# Patient Record
Sex: FEMALE | Race: WHITE | Employment: FULL TIME | URBAN - METROPOLITAN AREA
[De-identification: names, ages, dates, MRNs, and addresses within clinical notes are randomized per-mention and may not be internally consistent; named-entity substitution may affect disease eponyms.]

---

## 2017-02-08 PROBLEM — I10 CHRONIC HYPERTENSION: Status: ACTIVE | Noted: 2017-02-08

## 2017-03-01 PROBLEM — O24.419 GDM (GESTATIONAL DIABETES MELLITUS): Status: ACTIVE | Noted: 2017-03-01

## 2017-03-06 ENCOUNTER — HOSPITAL ENCOUNTER (OUTPATIENT)
Dept: DIABETES SERVICES | Age: 32
Discharge: HOME | End: 2017-03-06
Payer: COMMERCIAL

## 2017-03-06 VITALS — WEIGHT: 186 LBS | BODY MASS INDEX: 35.12 KG/M2 | HEIGHT: 61 IN

## 2017-03-06 PROCEDURE — G0109 DIAB MANAGE TRN IND/GROUP: HCPCS

## 2017-03-06 NOTE — PROGRESS NOTES
Gestational Diabetes Self-Management Support Plan    Services Provided: Pt received education on nutrition and meal planning during pregnancy. Emotional support for adjustment to diagnosis was provided. Care Plan/Recommendations:  Pt instructed to record blood sugar 4x/day and record all meals and snacks. Pt to bring information to appointments with [facility]. Notes for Follow Up:   1. Barriers to checking blood glucose and adherence to meal plan identified:   None   2. Barriers to psychological adjustment to diagnosis identified:   Acceptance  3.  Patient needs to be seen by [facility] ASAP due to:    Appointment scheduled 3/9/17    Josue Gary MS, RD, LD  [facility]   [phone]

## 2017-03-09 PROBLEM — O24.410 DIET CONTROLLED GESTATIONAL DIABETES MELLITUS (GDM) IN SECOND TRIMESTER: Status: ACTIVE | Noted: 2017-03-01

## 2017-03-09 PROBLEM — O16.2 HYPERTENSION AFFECTING PREGNANCY IN SECOND TRIMESTER: Status: ACTIVE | Noted: 2017-02-08

## 2017-07-09 ENCOUNTER — HOSPITAL ENCOUNTER (INPATIENT)
Age: 32
LOS: 2 days | Discharge: HOME | End: 2017-07-11
Attending: OBSTETRICS & GYNECOLOGY | Admitting: OBSTETRICS & GYNECOLOGY
Payer: COMMERCIAL

## 2017-07-09 DIAGNOSIS — Z37.9 NORMAL LABOR: Primary | ICD-10-CM

## 2017-07-09 LAB
ABO + RH BLD: NORMAL
BASE DEFICIT BLDCOA-SCNC: 3.9 MMOL/L (ref 0–2)
BASE DEFICIT BLDCOV-SCNC: 3.6 MMOL/L (ref 1.9–7.7)
BDY SITE: ABNORMAL
BDY SITE: ABNORMAL
BLOOD GROUP ANTIBODIES SERPL: NORMAL
ERYTHROCYTE [DISTWIDTH] IN BLOOD BY AUTOMATED COUNT: 13.7 % (ref 11.9–14.6)
GLUCOSE BLD STRIP.AUTO-MCNC: 101 MG/DL (ref 65–100)
HCO3 BLDCOA-SCNC: 22 MMOL/L (ref 22–26)
HCO3 BLDV-SCNC: 19 MMOL/L
HCT VFR BLD AUTO: 40.1 % (ref 35.8–46.3)
HGB BLD-MCNC: 14.2 G/DL (ref 11.7–15.4)
MCH RBC QN AUTO: 32.7 PG (ref 26.1–32.9)
MCHC RBC AUTO-ENTMCNC: 35.4 G/DL (ref 31.4–35)
MCV RBC AUTO: 92.4 FL (ref 79.6–97.8)
PCO2 BLDCOA: 42 MMHG (ref 33–49)
PCO2 BLDCOV: 27 MMHG (ref 14.1–43.3)
PH BLDCOA: 7.33 [PH] (ref 7.21–7.31)
PH BLDCOV: 7.46 [PH] (ref 7.2–7.44)
PLATELET # BLD AUTO: 307 K/UL (ref 150–450)
PMV BLD AUTO: 10.9 FL (ref 10.8–14.1)
PO2 BLDCOA: 14 MMHG (ref 9–19)
PO2 BLDV: 24 MMHG (ref 30.4–57.2)
RBC # BLD AUTO: 4.34 M/UL (ref 4.05–5.25)
SERVICE CMNT-IMP: ABNORMAL
SERVICE CMNT-IMP: ABNORMAL
SPECIMEN EXP DATE BLD: NORMAL
WBC # BLD AUTO: 12.7 K/UL (ref 4.3–11.1)

## 2017-07-09 PROCEDURE — 86900 BLOOD TYPING SEROLOGIC ABO: CPT | Performed by: OBSTETRICS & GYNECOLOGY

## 2017-07-09 PROCEDURE — 75410000003 HC RECOV DEL/VAG/CSECN EA 0.5 HR

## 2017-07-09 PROCEDURE — 65270000029 HC RM PRIVATE

## 2017-07-09 PROCEDURE — 75410000002 HC LABOR FEE PER 1 HR

## 2017-07-09 PROCEDURE — 4A1HXCZ MONITORING OF PRODUCTS OF CONCEPTION, CARDIAC RATE, EXTERNAL APPROACH: ICD-10-PCS | Performed by: OBSTETRICS & GYNECOLOGY

## 2017-07-09 PROCEDURE — 74011000250 HC RX REV CODE- 250: Performed by: OBSTETRICS & GYNECOLOGY

## 2017-07-09 PROCEDURE — 82962 GLUCOSE BLOOD TEST: CPT

## 2017-07-09 PROCEDURE — 74011250637 HC RX REV CODE- 250/637: Performed by: OBSTETRICS & GYNECOLOGY

## 2017-07-09 PROCEDURE — 74011250636 HC RX REV CODE- 250/636: Performed by: OBSTETRICS & GYNECOLOGY

## 2017-07-09 PROCEDURE — 85027 COMPLETE CBC AUTOMATED: CPT | Performed by: OBSTETRICS & GYNECOLOGY

## 2017-07-09 PROCEDURE — 59025 FETAL NON-STRESS TEST: CPT

## 2017-07-09 PROCEDURE — 82803 BLOOD GASES ANY COMBINATION: CPT

## 2017-07-09 PROCEDURE — 99282 EMERGENCY DEPT VISIT SF MDM: CPT

## 2017-07-09 PROCEDURE — 77030018846 HC SOL IRR STRL H20 ICUM -A: Performed by: OBSTETRICS & GYNECOLOGY

## 2017-07-09 PROCEDURE — 75410000000 HC DELIVERY VAGINAL/SINGLE

## 2017-07-09 RX ORDER — SODIUM CHLORIDE 0.9 % (FLUSH) 0.9 %
5-10 SYRINGE (ML) INJECTION AS NEEDED
Status: DISCONTINUED | OUTPATIENT
Start: 2017-07-09 | End: 2017-07-11 | Stop reason: ALTCHOICE

## 2017-07-09 RX ORDER — IBUPROFEN 400 MG/1
400 TABLET ORAL
Status: DISCONTINUED | OUTPATIENT
Start: 2017-07-09 | End: 2017-07-11 | Stop reason: HOSPADM

## 2017-07-09 RX ORDER — LIDOCAINE HYDROCHLORIDE 10 MG/ML
1 INJECTION INFILTRATION; PERINEURAL
Status: DISCONTINUED | OUTPATIENT
Start: 2017-07-09 | End: 2017-07-09 | Stop reason: HOSPADM

## 2017-07-09 RX ORDER — DIPHENHYDRAMINE HCL 25 MG
25 CAPSULE ORAL
Status: DISCONTINUED | OUTPATIENT
Start: 2017-07-09 | End: 2017-07-11 | Stop reason: HOSPADM

## 2017-07-09 RX ORDER — ZOLPIDEM TARTRATE 5 MG/1
5 TABLET ORAL
Status: DISCONTINUED | OUTPATIENT
Start: 2017-07-09 | End: 2017-07-11 | Stop reason: HOSPADM

## 2017-07-09 RX ORDER — HYDROCODONE BITARTRATE AND ACETAMINOPHEN 5; 325 MG/1; MG/1
1 TABLET ORAL
Status: DISCONTINUED | OUTPATIENT
Start: 2017-07-09 | End: 2017-07-11 | Stop reason: HOSPADM

## 2017-07-09 RX ORDER — OXYTOCIN/RINGER'S LACTATE 30/500 ML
500 PLASTIC BAG, INJECTION (ML) INTRAVENOUS
Status: DISCONTINUED | OUTPATIENT
Start: 2017-07-09 | End: 2017-07-11 | Stop reason: ALTCHOICE

## 2017-07-09 RX ORDER — OXYTOCIN/RINGER'S LACTATE 15/250 ML
250 PLASTIC BAG, INJECTION (ML) INTRAVENOUS ONCE
Status: ACTIVE | OUTPATIENT
Start: 2017-07-09 | End: 2017-07-09

## 2017-07-09 RX ORDER — MINERAL OIL
120 OIL (ML) ORAL
Status: DISCONTINUED | OUTPATIENT
Start: 2017-07-09 | End: 2017-07-09 | Stop reason: HOSPADM

## 2017-07-09 RX ORDER — LIDOCAINE HYDROCHLORIDE 20 MG/ML
JELLY TOPICAL
Status: COMPLETED | OUTPATIENT
Start: 2017-07-09 | End: 2017-07-09

## 2017-07-09 RX ORDER — DEXTROSE, SODIUM CHLORIDE, SODIUM LACTATE, POTASSIUM CHLORIDE, AND CALCIUM CHLORIDE 5; .6; .31; .03; .02 G/100ML; G/100ML; G/100ML; G/100ML; G/100ML
125 INJECTION, SOLUTION INTRAVENOUS CONTINUOUS
Status: DISCONTINUED | OUTPATIENT
Start: 2017-07-09 | End: 2017-07-11 | Stop reason: ALTCHOICE

## 2017-07-09 RX ORDER — SIMETHICONE 80 MG
80 TABLET,CHEWABLE ORAL
Status: DISCONTINUED | OUTPATIENT
Start: 2017-07-09 | End: 2017-07-11 | Stop reason: HOSPADM

## 2017-07-09 RX ORDER — NALOXONE HYDROCHLORIDE 0.4 MG/ML
0.4 INJECTION, SOLUTION INTRAMUSCULAR; INTRAVENOUS; SUBCUTANEOUS AS NEEDED
Status: DISCONTINUED | OUTPATIENT
Start: 2017-07-09 | End: 2017-07-11 | Stop reason: HOSPADM

## 2017-07-09 RX ORDER — BUTORPHANOL TARTRATE 1 MG/ML
1 INJECTION INTRAMUSCULAR; INTRAVENOUS
Status: DISCONTINUED | OUTPATIENT
Start: 2017-07-09 | End: 2017-07-09 | Stop reason: HOSPADM

## 2017-07-09 RX ORDER — OXYCODONE AND ACETAMINOPHEN 7.5; 325 MG/1; MG/1
2 TABLET ORAL
Status: DISCONTINUED | OUTPATIENT
Start: 2017-07-09 | End: 2017-07-11 | Stop reason: HOSPADM

## 2017-07-09 RX ORDER — SODIUM CHLORIDE 0.9 % (FLUSH) 0.9 %
5-10 SYRINGE (ML) INJECTION EVERY 8 HOURS
Status: DISCONTINUED | OUTPATIENT
Start: 2017-07-09 | End: 2017-07-11 | Stop reason: ALTCHOICE

## 2017-07-09 RX ADMIN — LIDOCAINE HYDROCHLORIDE: 20 JELLY TOPICAL at 07:35

## 2017-07-09 RX ADMIN — IBUPROFEN 400 MG: 400 TABLET ORAL at 19:31

## 2017-07-09 RX ADMIN — OXYTOCIN 500 ML/HR: 10 INJECTION, SOLUTION INTRAMUSCULAR; INTRAVENOUS at 07:49

## 2017-07-09 NOTE — PROGRESS NOTES
Assisted up to bathroom to void , voided without difficulty , theodora care completed , pad and panties on. Pt ambulated with steady gait.  In wheelchair for transfer to MIU

## 2017-07-09 NOTE — PROGRESS NOTES
Delivery Note    Dr Shelby Bishop arrived to bedside at 6973. Pt positioned for delivery and set up at [phone]. Spontaneous Vaginal delivery of viable male infant @ 18    Apgar's 8/9. Perineum intact     Delivery of intact appearing placenta @ 0749    Pt down from stirrups and recovery started at 0800    See delivery summary for details.

## 2017-07-09 NOTE — PROGRESS NOTES
Pt admitted to room 433. Consents signed. IV started labs sent. Dr. Jaramillo Rescue called for update.  Pt sve7/100/0

## 2017-07-09 NOTE — H&P
History & Physical    Name: Alek yWman MRN: [id number]  SSN: [id number]    YOB: 1985  Age: 34 y.o. Sex: female    Chief c/o: painful contractions    Subjective:     Estimated Date of Delivery: 17  OB History    Para Term  AB Living   6 2 1 1 3 2   SAB TAB Ectopic Molar Multiple Live Births   3 0 0  0 2      # Outcome Date GA Lbr Nathaniel/2nd Weight Sex Delivery Anes PTL Lv   6 Current            5 SAB 10/11/16 6w0d          4 SAB 16 4w0d          3 Term 06 38w0d  3.629 kg M Vag-Spont  N FRAN   2 SAB  8w0d          1  03 36w0d  3.033 kg Js Hammond          Ms. Delgado is admitted with pregnancy at 37w1d for active labor. Reports gush of fluid around 2:00 am with contractions becoming painful after that. Prenatal course was complicated by diabetes - gestational and elevated blood pressure in physician's office . Please see prenatal records for details. Past Medical History:   Diagnosis Date    Abnormal Papanicolaou smear of cervix     Gestational diabetes     High cholesterol     Hypertension     dx      Past Surgical History:   Procedure Laterality Date    HX WISDOM TEETH EXTRACTION       Social History     Occupational History    Not on file. Social History Main Topics    Smoking status: Never Smoker    Smokeless tobacco: Never Used    Alcohol use No    Drug use: No    Sexual activity: Yes     Partners: Male     Birth control/ protection: None     Family History   Problem Relation Age of Onset    Depression Mother     Cancer Maternal Grandmother      colon, breast       No Known Allergies  Prior to Admission medications    Medication Sig Start Date End Date Taking? Authorizing Provider   cholecalciferol, vitamin D3, (VITAMIN D3) 2,000 unit tab Take  by mouth.    Yes Historical Provider   Blood-Glucose Meter monitoring kit Check blood sugar 4 times a day 3/1/17  Yes Rachel Jason MD   Lancets misc Check blood sugar 4 times a day 3/1/17  Yes Oumar May MD   glucose blood VI test strips (ASCENSIA AUTODISC VI, ONE TOUCH ULTRA TEST VI) strip Check blood sugars 4 times a day 3/1/17  Yes Oumar May MD   PNV#67-iron ps-FA cmb#1-dha (VITAFOL ULTRA) 29 mg iron- 1 mg-200 mg cap Take 1 Tab by mouth daily. 2/8/17  Yes Brian Madison Alt, DO   Cetirizine (ZYRTEC) 10 mg cap Take  by mouth. Yes Historical Provider   diphenhydrAMINE (BENADRYL) 25 mg capsule Take 25 mg by mouth every six (6) hours as needed. Yes Historical Provider   glyBURIDE (DIABETA) 2.5 mg tablet Take 1 Tab by mouth two (2) times daily (with meals). 3/20/17   Tmamy Singh MD   aspirin delayed-release 81 mg tablet Take  by mouth daily. Historical Provider        Review of Systems: A comprehensive review of systems was negative except for that written in the HPI. Objective:     Vitals:  Vitals:    07/09/17 0517 07/09/17 0519   BP:  140/85   Pulse:  97   Resp:  18   Temp:  98.7 °F (37.1 °C)   Weight: 82.6 kg (182 lb)    Height: 5' 1\" (1.549 m)         Physical Exam:  Patient without distress except painful contractions  Heart: Regular rate and rhythm  Lung: clear to auscultation throughout lung fields, no wheezes, no rales, no rhonchi and normal respiratory effort  Back: costovertebral angle tenderness absent  Abdomen: soft, nontender  Fundus: soft and non tender  Perineum: blood absent, amniotic fluid present  Cervical Exam: 6 cm dilated    100% effaced    -1 station    Presenting Part: cephalic  Lower Extremities:  - Edema 1+   - Patellar Reflexes: 2+ bilaterally  Membranes:  Spontaneous Rupture of Membranes;  Amniotic Fluid: clear fluid  Fetal Heart Rate: Reactive    Prenatal Labs:   Lab Results   Component Value Date/Time    Rubella, External immune 12/08/2016    GrBStrep, External negative 07/03/2017    HBsAg, External neg 12/08/2016    HIV, External non reactive 12/08/2016    RPR, External non reactive 12/08/2016    ABO,Rh A+ 12/08/2016         Assessment/Plan:     Active Problems:    Normal labor (7/9/2017)         Plan: 26 yo [id number] [facility] with srom. Reassuring fetal status, Labor  Progressing normally, Continue plan for vaginal delivery. Group B Strep was negative.   Gest diabetes- diet controlled with recent us efw 6#10 and ac 91%ile; hc/ac = .97  Proven pelvis 8#    Signed By:  Anne-Marie Sarabia MD     July 9, 2017

## 2017-07-09 NOTE — PROGRESS NOTES
SBAR IN Report: Mother    Verbal report received from Chuck Del Valle RN on this patient, who is now being transferred from L & D (unit) for routine progression of care. The patient is not wearing a green \"Anesthesia-Duramorph\" band. Report consisted of patient's Situation, Background, Assessment and Recommendations (SBAR).  ID bands were compared with the identification form, and verified with the patient and transferring nurse. Information from the Procedure Summary, Intake/Output and MAR and the Jessica Report was reviewed with the transferring nurse; opportunity for questions and clarification provided.

## 2017-07-09 NOTE — PROGRESS NOTES
SBAR OUT Report: Mother    Verbal report given to Марина Mckeon Rn   on this patient, who is now being transferred to miu (unit) for routine progression of care. The patient is not wearing a green \"Anesthesia-Duramorph\" band. Report consisted of patient's Situation, Background, Assessment and Recommendations (SBAR).  ID bands were compared with the identification form, and verified with the patient and receiving nurse. Information from the SBAR, Kardex, Procedure Summary, Intake/Output, MAR, Accordion, Recent Results and Med Rec Status and the [place] Report was reviewed with the receiving nurse; opportunity for questions and clarification provided.

## 2017-07-09 NOTE — PROGRESS NOTES
Nutrition: Acknowledge BPA for gestational diabetes. Pt is post partum. Please submit consult if indicated during course of stay. Otherwise will follow-up according to nutrition care protocol.     Norma Schroeder MS, [facility], [address], [place]  W: [phone]  C: [phone]

## 2017-07-09 NOTE — LACTATION NOTE

## 2017-07-09 NOTE — IP AVS SNAPSHOT
Current Discharge Medication List  
  
ASK your doctor about these medications Dose & Instructions Dispensing Information Comments Morning Noon Evening Bedtime  
 aspirin delayed-release 81 mg tablet Your last dose was: Your next dose is: Take  by mouth daily. Refills:  0  
     
   
   
   
  
 BENADRYL 25 mg capsule Generic drug:  diphenhydrAMINE Your last dose was: Your next dose is:    
   
   
 Dose:  25 mg Take 25 mg by mouth every six (6) hours as needed. Refills:  0 Blood-Glucose Meter monitoring kit Your last dose was: Your next dose is:    
   
   
 Check blood sugar 4 times a day Quantity:  1 Kit Refills:  0  
     
   
   
   
  
 glucose blood VI test strips strip Commonly known as:  ASCENSIA AUTODISC VI, ONE TOUCH ULTRA TEST VI Your last dose was: Your next dose is:    
   
   
 Check blood sugars 4 times a day Quantity:  200 Strip Refills:  5  
     
   
   
   
  
 glyBURIDE 2.5 mg tablet Commonly known as:  Angi Ang Your last dose was: Your next dose is:    
   
   
 Dose:  2.5 mg Take 1 Tab by mouth two (2) times daily (with meals). Quantity:  60 Tab Refills:  1 Lancets Misc Your last dose was: Your next dose is:    
   
   
 Check blood sugar 4 times a day Quantity:  200 Each Refills:  5 PNV 67-iron ps-folate no. 1-dha 29 mg iron- 1 mg-200 mg Cap Commonly known as:  Tricia Carvalho Your last dose was: Your next dose is:    
   
   
 Dose:  1 Tab Take 1 Tab by mouth daily. Quantity:  30 Tab Refills:  12 VITAMIN D3 2,000 unit Tab Generic drug:  cholecalciferol (vitamin D3) Your last dose was: Your next dose is: Take  by mouth. Refills:  0 ZyrTEC 10 mg Cap Generic drug:  Cetirizine Your last dose was: Your next dose is: Take  by mouth. Refills:  0

## 2017-07-09 NOTE — IP AVS SNAPSHOT
[address] [place]  
[phone] Patient: Romario Delgado MRN: [id number] OGV:[id number] You are allergic to the following No active allergies Recent Documentation Height Weight Breastfeeding? BMI OB Status Smoking Status 1.549 m 82.6 kg Yes 34.39 kg/m2 Recent pregnancy Never Smoker Emergency Contacts Name Discharge Info Relation Home Work Mobile Shayan Groves  Spouse [3] [phone] About your hospitalization You were admitted on:  July 9, 2017 You last received care in the:  [address] [facility] You were discharged on:  July 11, 2017 Unit phone number:  [phone] Why you were hospitalized Your primary diagnosis was:  Not on File Your diagnoses also included:  Normal Labor Providers Seen During Your Hospitalizations Provider Role Specialty Primary office phone Mendel Steve DO Attending Provider Obstetrics & Gynecology [phone] Your Primary Care Physician (PCP) Primary Care Physician Office Phone Office Fax NONE ** None ** ** None ** Follow-up Information None Current Discharge Medication List  
  
ASK your doctor about these medications Dose & Instructions Dispensing Information Comments Morning Noon Evening Bedtime  
 aspirin delayed-release 81 mg tablet Your last dose was: Your next dose is: Take  by mouth daily. Refills:  0  
     
   
   
   
  
 BENADRYL 25 mg capsule Generic drug:  diphenhydrAMINE Your last dose was: Your next dose is:    
   
   
 Dose:  25 mg Take 25 mg by mouth every six (6) hours as needed. Refills:  0 Blood-Glucose Meter monitoring kit Your last dose was: Your next dose is:    
   
   
 Check blood sugar 4 times a day Quantity:  1 Kit Refills:  0 glucose blood VI test strips strip Commonly known as:  ASCENSIA AUTODISC VI, ONE TOUCH ULTRA TEST VI Your last dose was: Your next dose is:    
   
   
 Check blood sugars 4 times a day Quantity:  200 Strip Refills:  5  
     
   
   
   
  
 glyBURIDE 2.5 mg tablet Commonly known as:  [place] Him Your last dose was: Your next dose is:    
   
   
 Dose:  2.5 mg Take 1 Tab by mouth two (2) times daily (with meals). Quantity:  60 Tab Refills:  1 Lancets Misc Your last dose was: Your next dose is:    
   
   
 Check blood sugar 4 times a day Quantity:  200 Each Refills:  5 PNV 67-iron ps-folate no. 1-dha 29 mg iron- 1 mg-200 mg Cap Commonly known as:  Melva Cooll Your last dose was: Your next dose is:    
   
   
 Dose:  1 Tab Take 1 Tab by mouth daily. Quantity:  30 Tab Refills:  12 VITAMIN D3 2,000 unit Tab Generic drug:  cholecalciferol (vitamin D3) Your last dose was: Your next dose is: Take  by mouth. Refills:  0 ZyrTEC 10 mg Cap Generic drug:  Cetirizine Your last dose was: Your next dose is: Take  by mouth. Refills:  0 Discharge Instructions Vaginal Childbirth: Care Instructions Your Care Instructions Your body will slowly heal in the next few weeks. It is easy to get too tired and overwhelmed during the first weeks after your baby is born. Changes in your hormones can shift your mood without warning. You may find it hard to meet the extra demands on your energy and time. Take it easy on yourself. Follow-up care is a key part of your treatment and safety. Be sure to make and go to all appointments, and call your doctor if you are having problems.  It's also a good idea to know your test results and keep a list of the medicines you take. How can you care for yourself at home? · Vaginal bleeding and cramps ¨ After delivery, you will have a bloody discharge from the vagina. This will turn pink within a week and then white or yellow after about 10 days. It may last for 2 to 4 weeks or longer, until the uterus has healed. Use pads instead of tampons until you stop bleeding. ¨ Do not worry if you pass some blood clots, as long as they are smaller than a golf ball. If you have a tear or stitches in your vaginal area, change the pad at least every 4 hours to prevent soreness and infection. ¨ You may have cramps for the first few days after childbirth. These are normal and occur as the uterus shrinks to normal size. Take an over-the-counter pain medicine, such as acetaminophen (Tylenol), ibuprofen (Advil, Motrin), or naproxen (Aleve), for cramps. Read and follow all instructions on the label. Do not take aspirin, because it can cause more bleeding. ¨ Do not take two or more pain medicines at the same time unless the doctor told you to. Many pain medicines have acetaminophen, which is Tylenol. Too much acetaminophen (Tylenol) can be harmful. · Stitches ¨ If you have stitches, they will dissolve on their own and do not need to be removed. Follow your doctor's instructions for cleaning the stitched area. ¨ Put ice or a cold pack on your painful area for 10 to 20 minutes at a time, several times a day, for the first few days. Put a thin cloth between the ice and your skin. ¨ Sit in a few inches of warm water (sitz bath) 3 times a day and after bowel movements. The warm water helps with pain and itching. If you do not have a tub, a warm shower might help. · Breast fullness ¨ Your breasts may overfill (engorge) in the first few days after delivery. To help milk flow and to relieve pain, warm your breasts in the shower or by using warm, moist towels before nursing. ¨ If you are not nursing, do not put warmth on your breasts or touch your breasts. Wear a tight bra or sports bra and use ice until the fullness goes away. This usually takes 2 to 3 days. ¨ Put ice or a cold pack on your breast after nursing to reduce swelling and pain. Put a thin cloth between the ice and your skin. · Activity ¨ Eat a balanced diet. Do not try to lose weight by cutting calories. Keep taking your prenatal vitamins, or take a multivitamin. ¨ Get as much rest as you can. Try to take naps when your baby sleeps during the day. ¨ Get some exercise every day. But do not do any heavy exercise until your doctor says it is okay. ¨ Wait until you are healed (about 4 to 6 weeks) before you have sexual intercourse. Your doctor will tell you when it is okay to have sex. ¨ Talk to your doctor about birth control. You can get pregnant even before your period returns. Also, you can get pregnant while you are breastfeeding. · Mental health ¨ It is normal to have some sadness, anxiety, sleeplessness, and mood swings after you go home. If you feel upset or hopeless for more than a few days or are having trouble doing the things you need to do, talk to your doctor. · Constipation and hemorrhoids ¨ Drink plenty of fluids, enough so that your urine is light yellow or clear like water. If you have kidney, heart, or liver disease and have to limit fluids, talk with your doctor before you increase the amount of fluids you drink. ¨ Eat plenty of fiber each day. Have a bran muffin or bran cereal for breakfast, and try eating a piece of fruit for a mid-afternoon snack. ¨ For painful, itchy hemorrhoids, put ice or a cold pack on the area several times a day for 10 minutes at a time. Follow this by putting a warm compress on the area for another 10 to 20 minutes or by sitting in a shallow, warm bath. When should you call for help? Call 911 anytime you think you may need emergency care. For example, call if: · You are thinking of hurting yourself, your baby, or anyone else. · You have sudden, severe pain in your belly. · You passed out (lost consciousness). Call your doctor now or seek immediate medical care if: 
· You have severe vaginal bleeding. · You are soaking through a pad each hour for 2 or more hours. · Your vaginal bleeding seems to be getting heavier or is still bright red 4 days after delivery. · You are dizzy or lightheaded, or you feel like you may faint. · You are vomiting or cannot keep fluids down. · You have a fever. · You have new or more belly pain. · You pass tissue (not just blood). · Your vaginal discharge smells bad. · Your belly feels tender or full and hard. · Your breasts are continuously painful or red. · You feel sad, anxious, or hopeless for more than a few days. Watch closely for changes in your health, and be sure to contact your doctor if you have any problems. Where can you learn more? Go to http://donny-sera.info/. Enter N565 in the search box to learn more about \"Vaginal Childbirth: Care Instructions. \" Current as of: March 16, 2017 Content Version: 11.3 © 1650-8220 My[employer]OBE. Care instructions adapted under license by [employer] (which disclaims liability or warranty for this information). If you have questions about a medical condition or this instruction, always ask your healthcare professional. [place] [address] any warranty or liability for your use of this information. Discharge Orders None Introducing [facility]! Dear Maral Calero: Thank you for requesting a Undertone account. Our records indicate that you already have an active Undertone account. You can access your account anytime at [email or web address] Did you know that you can access your hospital and ER discharge instructions at any time in Undertone?   You can also review all of your test results from your hospital stay or ER visit. Additional Information If you have questions, please visit the Frequently Asked Questions section of the L2 website at [email or web address] Remember, MyChart is NOT to be used for urgent needs. For medical emergencies, dial 911. Now available from your iPhone and Android! General Information Please provide this summary of care documentation to your next provider. Patient Signature:  ____________________________________________________________ Date:  ____________________________________________________________  
  
[facility] Provider Signature:  ____________________________________________________________ Date:  ____________________________________________________________

## 2017-07-09 NOTE — L&D DELIVERY NOTE
Delivery Summary    Patient: Kassi Cooper MRN: [id number]  SSN: [id number]    YOB: 1985  Age: 34 y.o. Sex: female        Labor Events:    Labor: No    Rupture Date: 2017    Rupture Time: 2:00 AM    Rupture Type SROM    Amniotic Fluid Volume:      Amniotic Fluid Description: Clear  None    Induction: None        Augmentation: None    Labor Complications: None     Additional Complications:        Cervical Ripening:       None      Delivery Events:  Episiotomy:      Laceration(s):        Repaired:       Number of Repair Packets:      Suture Type and Size:         Estimated Blood Loss (ml):          Information for the patient's : Roylene Meckel [[id number]]     Delivery Summary - Baby    Delivery Date: 2017   Delivery Time: 7:40 AM   Delivery Type: Vaginal, Spontaneous Delivery  Sex:  male  Gestational Age: 42w4d  Delivery Clinician:  Tony Castellon?:     Delivery Location:               [facility]  One minute Five minutes Ten minutes   Skin Color:            Heart Rate:             Reflex Irritability:             Muscle Tone:           Respiration:             Total:               Presentation: Vertex  Position: Left Occiput Anterior  Resuscitation Method:        Meconium Stained:      Cord Information: 3 Vessels   Complications: None  Cord Blood Sent?:  Yes    Blood Gases Sent?:  Yes    Placenta:  Date/Time:   7:49 AM  Removal: Spontaneous      Appearance: Normal     [place] Measurements:  Birth Weight:      Birth Length:     Head Circumference:       Chest Circumference:      Abdominal Girth: Other Providers:   LIZETH DICKSON;CAIO RODAS;JASON ROBERTS;SONJA LEO Obstetrician;Primary Nurse;Charge Nurse;Scrub Tech           Cord Blood Results:  Information for the patient's :   Joshua Delgado [[id number]]   No results found for: ABORH, PCTABR, PCTDIG, BILI, ABORHEXT, [address]    Information for the patient's : Jose Antonio Delgadoa Portal [[id number]]   No results found for: APH, APCO2, APO2, AHCO3, ABEC, ABDC, O2ST, SITE, RSCOM, PHI, PCO2I, PO2I, HCO3I, SO2I, IBD     Information for the patient's : GiodevorahryanEster Rhine [[id number]]   No results found for: EPHV, PCO2V, PO2V, HCO3V, O2STV, EBDV   over intact perineum, pt with loose piece of vaginal tissue already present prior to delivery- will not remove as not causing problems,  over intact perineum, spontaneous delivery of placenta, uterus not explored as placenta intact and no aneshesia, ff, minimal bleeding, cervix vagina inspected no tears. Excellent hemostasis, cosmesis.  R-V exam normal

## 2017-07-09 NOTE — PROGRESS NOTES
Pt to triage room ARABELLA 01 with c/o SROM, clear fluid @ 0200 with contractions. Pt denies VB. EFM/[place] applied. Triage database and assessment completed. SVE deferred. Triage process explained to pt. Pt instructed on call light and placed within reach. Verbalized understanding to all teaching.   This RN will notify Dr Aubrey Matute of pt gest age, hx, complaint, contraction pattern etc.

## 2017-07-09 NOTE — LACTATION NOTE
In to see mom and infant for first time. Nurse had informed me that infant has not latched since birth and his blood sugar is low. Assisted mom with attempt to latch infant on right breast in football hold. Infant latched but would not suck. Started mom pumping and she was able to express drops and fed that to infant on her finger. Informed  Bedside nurse of time feeding ended. Instructed mom to continue to offer breast and if no latch to pump and feed expressed colostrum to infant.  Lactation consultant will follow up in am.

## 2017-07-09 NOTE — PROGRESS NOTES
07/09/17 0730   Cervical Exam   Dilation (cm) 10   Eff 100 %   Station +2   Vaginal exam done by? Magdalene Gaming RN   Pt c/o pressure , Dr Lincoln Bro notified to come for delivery.

## 2017-07-09 NOTE — IP AVS SNAPSHOT
Summary of Care Report The Summary of Care report has been created to help improve care coordination. Users with access to [employer] or [address] (Web-based application) may access additional patient information including the Discharge Summary. If you are not currently a [address] user and need more information, please call the number listed below in the [place] section and ask to be connected with Medical Records. Facility Information Name Address Phone [phone] [address] [place] [id number] [phone] Patient Information Patient Name Sex  Irena Moseley ([id number]) Female 1985 Discharge Information Admitting Provider Service Area Unit [address] [facility], [facility] [place], DO / [address] 4 Mother Infant / [phone] Discharge Provider Discharge Date/Time Discharge Disposition Destination (none) (none) (none) (none) Patient Language Language ENGLISH [13] Hospital Problems as of 2017  Reviewed: 7/3/2017  4:30 PM by Wicho Bliss MD  
  
  
  
 Class Noted - Resolved Last Modified POA Active Problems Normal labor  2017 - Present 2017 by Jessa Sanchez MD Unknown Entered by Jessa Sanchez MD  
  
Non-Hospital Problems as of 2017  Reviewed: 7/3/2017  4:30 PM by Wicho Bliss MD  
  
  
  
 Class Noted - Resolved Last Modified Active Problems History of  delivery, currently pregnant in second trimester  2016 - Present 5/3/2017 by Nicole Main MD  
  Entered by Rocael Curran RN Overview Addendum 2017  9:09 AM by Ester Logan RN  
   G1- delivered at 36 weeks G2 delivered at 38 weeks 3/9/2017 MFM: Would not consider is at increased risk for PTD. Progesterone not indicated. · Cervical length for symptoms. Obesity affecting pregnancy  12/8/2016 - Present 5/3/2017 by Boy Wilson MD  
  Entered by Tommy Morgan RN Overview Addendum 4/5/2017  9:09 AM by Syed Ngo RN  
   3/9/2017 MFM: BMI 35; class 2 obesity. · Recommend gain less than 15 lbs. Weight loss is okay. Hypertension affecting pregnancy in second trimester  2/8/2017 - Present 5/5/2017 by Boy Wilson MD  
  Entered by Tommy Spain DO Overview Addendum 5/5/2017  8:23 AM by Boy Wilson MD  
    
5/3/2017 at [facility]:  BP log reviewed; all normal readings. Pt mildly elevated in office today at 132/88. · Patient will continue monitoring BP once daily, increasing frequency if notes elevations · Recommend adding or changing medication prn to keep SBP < 150, DBP < 105. · Cont ASA and vit D · Watch BP in OB office. · Delivery timing for chronic hypertenstion on meds is 38-39 weeks, sooner for maternal or fetal indications. Diet controlled gestational diabetes mellitus (GDM) in second trimester  3/1/2017 - Present 5/3/2017 by Boy Wilson MD  
  Entered by Tommy Morgan RN Overview Addendum 5/3/2017  3:43 PM by Syed Ngo RN  
   3/9/2017 at [facility]:   AC 60%  JEFF NL. Severe needle phobia (family members doing finger pricks). Using alternate test sites; change to fingers only. 3/20/2017 at [facility] Glucose Log:  Patient sent message BS becoming elevated, Will start Glyburide 2.5 mgs, Rx sent to her pharmacy. 4/5/2017 at [facility]:  Appropriate fetal growth with normal repeat Echo. AC 56%, overall 47%, JEFF WNL. BG ranges . Overall good control, having a few occasional mild elevations. 5/3/2017 at [facility]:  Appropriate fetal growth. AC 58%, overall 60%, JEFF 18.5 cm. Good control of BG's. Current Regimen is diet control. Will take glyburide 2.5mg prn (has not needed to take). · Continue QID BG testing and send logs weekly on [employer] to [place] office. · Continue diet control and increased activity. Add medication if elevations become consistent; patient agreeable with prn glyburide. · No follow up at [facility]; will see back prn. You are allergic to the following No active allergies Current Discharge Medication List  
  
ASK your doctor about these medications Dose & Instructions Dispensing Information Comments  
 aspirin delayed-release 81 mg tablet Take  by mouth daily. Refills:  0  
   
 BENADRYL 25 mg capsule Generic drug:  diphenhydrAMINE Dose:  25 mg Take 25 mg by mouth every six (6) hours as needed. Refills:  0 Blood-Glucose Meter monitoring kit Check blood sugar 4 times a day Quantity:  1 Kit Refills:  0  
   
 glucose blood VI test strips strip Commonly known as:  ASCENSIA AUTODISC VI, ONE TOUCH ULTRA TEST VI Check blood sugars 4 times a day Quantity:  200 Strip Refills:  5  
   
 glyBURIDE 2.5 mg tablet Commonly known as:  [place] Car Dose:  2.5 mg Take 1 Tab by mouth two (2) times daily (with meals). Quantity:  60 Tab Refills:  1 Lancets Misc Check blood sugar 4 times a day Quantity:  200 Each Refills:  5 PNV 67-iron ps-folate no. 1-dha 29 mg iron- 1 mg-200 mg Cap Commonly known as:  [place] Shaker Dose:  1 Tab Take 1 Tab by mouth daily. Quantity:  30 Tab Refills:  12 VITAMIN D3 2,000 unit Tab Generic drug:  cholecalciferol (vitamin D3) Take  by mouth. Refills:  0 ZyrTEC 10 mg Cap Generic drug:  Cetirizine Take  by mouth. Refills:  0 Follow-up Information None Discharge Instructions Vaginal Childbirth: Care Instructions Your Care Instructions Your body will slowly heal in the next few weeks. It is easy to get too tired and overwhelmed during the first weeks after your baby is born. Changes in your hormones can shift your mood without warning.  You may find it hard to meet the extra demands on your energy and time. Take it easy on yourself. Follow-up care is a key part of your treatment and safety. Be sure to make and go to all appointments, and call your doctor if you are having problems. It's also a good idea to know your test results and keep a list of the medicines you take. How can you care for yourself at home? · Vaginal bleeding and cramps ¨ After delivery, you will have a bloody discharge from the vagina. This will turn pink within a week and then white or yellow after about 10 days. It may last for 2 to 4 weeks or longer, until the uterus has healed. Use pads instead of tampons until you stop bleeding. ¨ Do not worry if you pass some blood clots, as long as they are smaller than a golf ball. If you have a tear or stitches in your vaginal area, change the pad at least every 4 hours to prevent soreness and infection. ¨ You may have cramps for the first few days after childbirth. These are normal and occur as the uterus shrinks to normal size. Take an over-the-counter pain medicine, such as acetaminophen (Tylenol), ibuprofen (Advil, Motrin), or naproxen (Aleve), for cramps. Read and follow all instructions on the label. Do not take aspirin, because it can cause more bleeding. ¨ Do not take two or more pain medicines at the same time unless the doctor told you to. Many pain medicines have acetaminophen, which is Tylenol. Too much acetaminophen (Tylenol) can be harmful. · Stitches ¨ If you have stitches, they will dissolve on their own and do not need to be removed. Follow your doctor's instructions for cleaning the stitched area. ¨ Put ice or a cold pack on your painful area for 10 to 20 minutes at a time, several times a day, for the first few days. Put a thin cloth between the ice and your skin. ¨ Sit in a few inches of warm water (sitz bath) 3 times a day and after bowel movements. The warm water helps with pain and itching.  If you do not have a tub, a warm shower might help. · Breast fullness ¨ Your breasts may overfill (engorge) in the first few days after delivery. To help milk flow and to relieve pain, warm your breasts in the shower or by using warm, moist towels before nursing. ¨ If you are not nursing, do not put warmth on your breasts or touch your breasts. Wear a tight bra or sports bra and use ice until the fullness goes away. This usually takes 2 to 3 days. ¨ Put ice or a cold pack on your breast after nursing to reduce swelling and pain. Put a thin cloth between the ice and your skin. · Activity ¨ Eat a balanced diet. Do not try to lose weight by cutting calories. Keep taking your prenatal vitamins, or take a multivitamin. ¨ Get as much rest as you can. Try to take naps when your baby sleeps during the day. ¨ Get some exercise every day. But do not do any heavy exercise until your doctor says it is okay. ¨ Wait until you are healed (about 4 to 6 weeks) before you have sexual intercourse. Your doctor will tell you when it is okay to have sex. ¨ Talk to your doctor about birth control. You can get pregnant even before your period returns. Also, you can get pregnant while you are breastfeeding. · Mental health ¨ It is normal to have some sadness, anxiety, sleeplessness, and mood swings after you go home. If you feel upset or hopeless for more than a few days or are having trouble doing the things you need to do, talk to your doctor. · Constipation and hemorrhoids ¨ Drink plenty of fluids, enough so that your urine is light yellow or clear like water. If you have kidney, heart, or liver disease and have to limit fluids, talk with your doctor before you increase the amount of fluids you drink. ¨ Eat plenty of fiber each day. Have a bran muffin or bran cereal for breakfast, and try eating a piece of fruit for a mid-afternoon snack.  
¨ For painful, itchy hemorrhoids, put ice or a cold pack on the area several times a day for 10 minutes at a time. Follow this by putting a warm compress on the area for another 10 to 20 minutes or by sitting in a shallow, warm bath. When should you call for help? Call 911 anytime you think you may need emergency care. For example, call if: 
· You are thinking of hurting yourself, your baby, or anyone else. · You have sudden, severe pain in your belly. · You passed out (lost consciousness). Call your doctor now or seek immediate medical care if: 
· You have severe vaginal bleeding. · You are soaking through a pad each hour for 2 or more hours. · Your vaginal bleeding seems to be getting heavier or is still bright red 4 days after delivery. · You are dizzy or lightheaded, or you feel like you may faint. · You are vomiting or cannot keep fluids down. · You have a fever. · You have new or more belly pain. · You pass tissue (not just blood). · Your vaginal discharge smells bad. · Your belly feels tender or full and hard. · Your breasts are continuously painful or red. · You feel sad, anxious, or hopeless for more than a few days. Watch closely for changes in your health, and be sure to contact your doctor if you have any problems. Where can you learn more? Go to http://odnny-sera.info/. Enter E079 in the search box to learn more about \"Vaginal Childbirth: Care Instructions. \" Current as of: March 16, 2017 Content Version: 11.3 © 1720-9284 [employer]. Care instructions adapted under license by [employer] (which disclaims liability or warranty for this information). If you have questions about a medical condition or this instruction, always ask your healthcare professional. [place] [address] any warranty or liability for your use of this information. Chart Review Routing History No Routing History on File

## 2017-07-10 LAB
GLUCOSE BLD STRIP.AUTO-MCNC: 90 MG/DL (ref 65–100)
GLUCOSE BLD STRIP.AUTO-MCNC: 96 MG/DL (ref 65–100)

## 2017-07-10 PROCEDURE — 65270000029 HC RM PRIVATE

## 2017-07-10 PROCEDURE — 82962 GLUCOSE BLOOD TEST: CPT

## 2017-07-10 PROCEDURE — 74011250637 HC RX REV CODE- 250/637: Performed by: OBSTETRICS & GYNECOLOGY

## 2017-07-10 RX ADMIN — IBUPROFEN 400 MG: 400 TABLET ORAL at 09:34

## 2017-07-10 RX ADMIN — IBUPROFEN 400 MG: 400 TABLET ORAL at 01:08

## 2017-07-10 NOTE — PROGRESS NOTES
Post-Partum Day Number 1 Progress Note    Patient doing well post-partum without significant complaint. Voiding withour difficulty, normal lochia. Vitals:  Patient Vitals for the past 8 hrs:   BP Temp Pulse Resp   07/10/17 0708 128/81 98.4 °F (36.9 °C) 73 18     Temp (24hrs), Av.1 °F (36.7 °C), Min:97.6 °F (36.4 °C), Max:98.4 °F (36.9 °C)      Vital signs stable, afebrile. Exam:  Patient without distress. Abdomen soft, fundus firm at level of umbilicus, nontender               Lower extremities are negative for swelling, cords or tenderness. Lab/Data Review: All lab results for the last 24 hours reviewed. Assessment and Plan:  Patient appears to be having uncomplicated post-partum course. Continue routine perineal care and maternal education. Plan discharge tomorrow if no problems occur.

## 2017-07-10 NOTE — PROGRESS NOTES
Pt request that blood sugars be discontinued. Dr. Crystal Pu updated and orders received to discontinue blood sugars.

## 2017-07-10 NOTE — LACTATION NOTE
This note was copied from a baby's chart. In to check on feedings. MD ordered continued supplement of formula for low blood sugar levels. Mom primarily gave bottle overnight but reports baby did latch well to both breasts at last feeding. Mom plans to attempt to latch baby first at all feeds. Will supplement post nursing. Reviewed use of pump if baby not latching and benefit of some additional pumping each day even if baby latching due to continued need for frequent formula feeds. Pump at bedside if needed. Mom states understanding. Mom confident. Offered to assist or observe latch at next feeding, mom to call out as needed. Baby sleeping now. RN updated.

## 2017-07-11 VITALS
RESPIRATION RATE: 16 BRPM | HEART RATE: 66 BPM | DIASTOLIC BLOOD PRESSURE: 83 MMHG | TEMPERATURE: 98.3 F | WEIGHT: 182 LBS | HEIGHT: 61 IN | BODY MASS INDEX: 34.36 KG/M2 | SYSTOLIC BLOOD PRESSURE: 138 MMHG

## 2017-07-11 PROCEDURE — 74011250637 HC RX REV CODE- 250/637: Performed by: OBSTETRICS & GYNECOLOGY

## 2017-07-11 RX ADMIN — IBUPROFEN 400 MG: 400 TABLET ORAL at 08:18

## 2017-07-11 RX ADMIN — IBUPROFEN 400 MG: 400 TABLET ORAL at 01:53

## 2017-07-11 NOTE — DISCHARGE SUMMARY
Obstetrical Discharge Summary     Name: Gilma King MRN: [id number]  SSN: [id number]    YOB: 1985  Age: 34 y.o. Sex: female      Admit Date: 2017    Discharge Date: 2017     Admitting Physician: Ileana Bright DO     Attending Physician:  Ileana Bright DO     * Admission Diagnoses: SROM;Normal labor;Normal labor    * Discharge Diagnoses:   Information for the patient's : Lori Damico [[id number]]   Delivery of a 3.1 kg male infant via Vaginal, Spontaneous Delivery on 2017 at 7:40 AM  by . Apgars were 8 and 9. Additional Diagnoses:   Hospital Problems as of 2017  Date Reviewed: 7/3/2017          Codes Class Noted - Resolved POA    Normal labor ICD-10-CM: O80, Z37.9  ICD-9-CM: 796  2017 - Present Unknown             Lab Results   Component Value Date/Time    ABO/Rh(D) A POSITIVE 2017 06:17 AM    Rubella, External immune 2016    GrBStrep, External negative 2017    ABO,Rh A+ 2016      There is no immunization history on file for this patient.     * Procedures: vaginal delivery  * No surgery found *      [place]  Depression Scale  I have been able to laugh and see the funny side of things: As much as I always could  I have looked forward with enjoyment to things: As much as I ever did  I have blamed myself unnecessarily when things went wrong: Not very often  I have been anxious or worried for no good reason: Hardly ever  I have felt scared or panicky for no very good reason: No, not at all  Things have been getting on top of me: No, most of the time I have coped quite well  I have been so unhappy that I have had difficulty sleeping: No, not at all  I have felt sad or miserable: No, not at all  I have been so unhappy that I have been crying: No, never  The thought of harming myself has occurred to me: Never  Total Score: 3    * Discharge Condition: good    * Hospital Course: Normal hospital course following the delivery. * Disposition: Home    Discharge Medications:   Current Discharge Medication List      CONTINUE these medications which have NOT CHANGED    Details   cholecalciferol, vitamin D3, (VITAMIN D3) 2,000 unit tab Take  by mouth. PNV#67-iron ps-FA cmb#1-dha (VITAFOL ULTRA) 29 mg iron- 1 mg-200 mg cap Take 1 Tab by mouth daily. Qty: 30 Tab, Refills: 12      Cetirizine (ZYRTEC) 10 mg cap Take  by mouth. diphenhydrAMINE (BENADRYL) 25 mg capsule Take 25 mg by mouth every six (6) hours as needed. aspirin delayed-release 81 mg tablet Take  by mouth daily. STOP taking these medications       Blood-Glucose Meter monitoring kit Comments:   Reason for Stopping:         Lancets misc Comments:   Reason for Stopping:         glucose blood VI test strips (ASCENSIA AUTODISC VI, ONE TOUCH ULTRA TEST VI) strip Comments:   Reason for Stopping:         glyBURIDE (DIABETA) 2.5 mg tablet Comments:   Reason for Stopping:               * Follow-up Care/Patient Instructions:   Activity: No sex for 6 weeks, No driving while on analgesics and No heavy lifting for 4 weeks  Diet: Regular Diet  Wound Care: Keep wound clean and dry    Follow-up Information     Follow up With Details Comments Contact Info    None   None ([phone]) Patient stated that they have no PCP             Signed By:  John Morfin MD     July 11, 2017

## 2017-07-11 NOTE — LACTATION NOTE

## 2017-07-11 NOTE — PROGRESS NOTES
Discharge teachng complete. Pt verbalized understanding, questions encouraged. .  Pt. Stable and discharged to home per MD order. Pt. Left unit via wheelchair with family ,  in carseat. [place] escorted off unit by [facility] staff. Pt. To home via private automobile.

## 2017-07-11 NOTE — DISCHARGE INSTRUCTIONS
Vaginal Childbirth: Care Instructions  Your Care Instructions  Your body will slowly heal in the next few weeks. It is easy to get too tired and overwhelmed during the first weeks after your baby is born. Changes in your hormones can shift your mood without warning. You may find it hard to meet the extra demands on your energy and time. Take it easy on yourself. Follow-up care is a key part of your treatment and safety. Be sure to make and go to all appointments, and call your doctor if you are having problems. It's also a good idea to know your test results and keep a list of the medicines you take. How can you care for yourself at home? · Vaginal bleeding and cramps  ¨ After delivery, you will have a bloody discharge from the vagina. This will turn pink within a week and then white or yellow after about 10 days. It may last for 2 to 4 weeks or longer, until the uterus has healed. Use pads instead of tampons until you stop bleeding. ¨ Do not worry if you pass some blood clots, as long as they are smaller than a golf ball. If you have a tear or stitches in your vaginal area, change the pad at least every 4 hours to prevent soreness and infection. ¨ You may have cramps for the first few days after childbirth. These are normal and occur as the uterus shrinks to normal size. Take an over-the-counter pain medicine, such as acetaminophen (Tylenol), ibuprofen (Advil, Motrin), or naproxen (Aleve), for cramps. Read and follow all instructions on the label. Do not take aspirin, because it can cause more bleeding. ¨ Do not take two or more pain medicines at the same time unless the doctor told you to. Many pain medicines have acetaminophen, which is Tylenol. Too much acetaminophen (Tylenol) can be harmful. · Stitches  ¨ If you have stitches, they will dissolve on their own and do not need to be removed. Follow your doctor's instructions for cleaning the stitched area.   ¨ Put ice or a cold pack on your painful area for 10 to 20 minutes at a time, several times a day, for the first few days. Put a thin cloth between the ice and your skin. ¨ Sit in a few inches of warm water (sitz bath) 3 times a day and after bowel movements. The warm water helps with pain and itching. If you do not have a tub, a warm shower might help. · Breast fullness  ¨ Your breasts may overfill (engorge) in the first few days after delivery. To help milk flow and to relieve pain, warm your breasts in the shower or by using warm, moist towels before nursing. ¨ If you are not nursing, do not put warmth on your breasts or touch your breasts. Wear a tight bra or sports bra and use ice until the fullness goes away. This usually takes 2 to 3 days. ¨ Put ice or a cold pack on your breast after nursing to reduce swelling and pain. Put a thin cloth between the ice and your skin. · Activity  ¨ Eat a balanced diet. Do not try to lose weight by cutting calories. Keep taking your prenatal vitamins, or take a multivitamin. ¨ Get as much rest as you can. Try to take naps when your baby sleeps during the day. ¨ Get some exercise every day. But do not do any heavy exercise until your doctor says it is okay. ¨ Wait until you are healed (about 4 to 6 weeks) before you have sexual intercourse. Your doctor will tell you when it is okay to have sex. ¨ Talk to your doctor about birth control. You can get pregnant even before your period returns. Also, you can get pregnant while you are breastfeeding. · Mental health  ¨ It is normal to have some sadness, anxiety, sleeplessness, and mood swings after you go home. If you feel upset or hopeless for more than a few days or are having trouble doing the things you need to do, talk to your doctor. · Constipation and hemorrhoids  ¨ Drink plenty of fluids, enough so that your urine is light yellow or clear like water.  If you have kidney, heart, or liver disease and have to limit fluids, talk with your doctor before you increase the amount of fluids you drink. ¨ Eat plenty of fiber each day. Have a bran muffin or bran cereal for breakfast, and try eating a piece of fruit for a mid-afternoon snack. ¨ For painful, itchy hemorrhoids, put ice or a cold pack on the area several times a day for 10 minutes at a time. Follow this by putting a warm compress on the area for another 10 to 20 minutes or by sitting in a shallow, warm bath. When should you call for help? Call 911 anytime you think you may need emergency care. For example, call if:  · You are thinking of hurting yourself, your baby, or anyone else. · You have sudden, severe pain in your belly. · You passed out (lost consciousness). Call your doctor now or seek immediate medical care if:  · You have severe vaginal bleeding. · You are soaking through a pad each hour for 2 or more hours. · Your vaginal bleeding seems to be getting heavier or is still bright red 4 days after delivery. · You are dizzy or lightheaded, or you feel like you may faint. · You are vomiting or cannot keep fluids down. · You have a fever. · You have new or more belly pain. · You pass tissue (not just blood). · Your vaginal discharge smells bad. · Your belly feels tender or full and hard. · Your breasts are continuously painful or red. · You feel sad, anxious, or hopeless for more than a few days. Watch closely for changes in your health, and be sure to contact your doctor if you have any problems. Where can you learn more? Go to http://donny-sera.info/. Enter Z724 in the search box to learn more about \"Vaginal Childbirth: Care Instructions. \"  Current as of: March 16, 2017  Content Version: 11.3  © 7323-8232 [employer]. Care instructions adapted under license by [employer] (which disclaims liability or warranty for this information).  If you have questions about a medical condition or this instruction, always ask your healthcare professional. [employer] disclaims any warranty or liability for your use of this information.

## 2017-07-11 NOTE — PROGRESS NOTES
Post-Partum Day Number 2 Progress/Discharge Note    Patient doing well post-partum without significant complaint. Voiding without difficulty, normal lochia, positive flatus. Vitals:  Patient Vitals for the past 8 hrs:   BP Temp Pulse Resp   17 0721 138/83 98.3 °F (36.8 °C) 66 16     Temp (24hrs), Av.4 °F (36.9 °C), Min:98.3 °F (36.8 °C), Max:98.4 °F (36.9 °C)      Vital signs stable, afebrile. Exam:  Patient without distress. Abdomen soft, fundus firm at level of umbilicus, non tender               Lower extremities are negative for swelling, cords or tenderness. Lab/Data Review: All lab results for the last 24 hours reviewed. Assessment and Plan:  Patient appears to be having uncomplicated post-partum course. Continue routine perineal care and maternal education. Plan discharge for today with follow up in our office in 2 weeks.

## 2017-07-20 PROBLEM — O16.2 HYPERTENSION AFFECTING PREGNANCY IN SECOND TRIMESTER: Status: RESOLVED | Noted: 2017-02-08 | Resolved: 2017-07-20

## 2017-07-20 PROBLEM — O24.410 DIET CONTROLLED GESTATIONAL DIABETES MELLITUS (GDM) IN SECOND TRIMESTER: Status: RESOLVED | Noted: 2017-03-01 | Resolved: 2017-07-20
